# Patient Record
Sex: MALE | Race: WHITE | Employment: FULL TIME | URBAN - NONMETROPOLITAN AREA
[De-identification: names, ages, dates, MRNs, and addresses within clinical notes are randomized per-mention and may not be internally consistent; named-entity substitution may affect disease eponyms.]

---

## 2024-07-26 ENCOUNTER — APPOINTMENT (OUTPATIENT)
Dept: CT IMAGING | Age: 44
DRG: 390 | End: 2024-07-26
Payer: OTHER GOVERNMENT

## 2024-07-26 ENCOUNTER — HOSPITAL ENCOUNTER (INPATIENT)
Age: 44
LOS: 3 days | Discharge: ANOTHER HOSPITAL | DRG: 390 | End: 2024-07-29
Attending: INTERNAL MEDICINE | Admitting: INTERNAL MEDICINE
Payer: OTHER GOVERNMENT

## 2024-07-26 DIAGNOSIS — K56.609 SBO (SMALL BOWEL OBSTRUCTION) (HCC): Primary | ICD-10-CM

## 2024-07-26 LAB
ALBUMIN SERPL-MCNC: 5.1 G/DL (ref 3.5–5.2)
ALBUMIN/GLOB SERPL: 1.4 {RATIO} (ref 1–2.5)
ALP SERPL-CCNC: 143 U/L (ref 40–129)
ALT SERPL-CCNC: 30 U/L (ref 5–41)
ANION GAP SERPL CALCULATED.3IONS-SCNC: 12 MMOL/L (ref 9–17)
AST SERPL-CCNC: 27 U/L
BASOPHILS # BLD: 0.07 K/UL (ref 0–0.2)
BASOPHILS NFR BLD: 0 % (ref 0–2)
BILIRUB SERPL-MCNC: 1.6 MG/DL (ref 0.3–1.2)
BILIRUB UR QL STRIP: NEGATIVE
BUN SERPL-MCNC: 14 MG/DL (ref 6–20)
BUN/CREAT SERPL: 14 (ref 9–20)
CALCIUM SERPL-MCNC: 9.7 MG/DL (ref 8.6–10.4)
CHLORIDE SERPL-SCNC: 96 MMOL/L (ref 98–107)
CLARITY UR: CLEAR
CO2 SERPL-SCNC: 30 MMOL/L (ref 20–31)
COLOR UR: YELLOW
CREAT SERPL-MCNC: 1 MG/DL (ref 0.7–1.2)
EOSINOPHIL # BLD: 0.36 K/UL (ref 0–0.44)
EOSINOPHILS RELATIVE PERCENT: 2 % (ref 1–4)
EPI CELLS #/AREA URNS HPF: NORMAL /HPF (ref 0–5)
ERYTHROCYTE [DISTWIDTH] IN BLOOD BY AUTOMATED COUNT: 14.4 % (ref 11.8–14.4)
GFR, ESTIMATED: >90 ML/MIN/1.73M2
GLUCOSE SERPL-MCNC: 104 MG/DL (ref 70–99)
GLUCOSE UR STRIP-MCNC: NEGATIVE MG/DL
HCT VFR BLD AUTO: 50.5 % (ref 40.7–50.3)
HGB BLD-MCNC: 17.2 G/DL (ref 13–17)
HGB UR QL STRIP.AUTO: NEGATIVE
IMM GRANULOCYTES # BLD AUTO: 0.11 K/UL (ref 0–0.3)
IMM GRANULOCYTES NFR BLD: 1 %
KETONES UR STRIP-MCNC: NEGATIVE MG/DL
LACTATE BLDV-SCNC: 1.4 MMOL/L (ref 0.5–2.2)
LEUKOCYTE ESTERASE UR QL STRIP: NEGATIVE
LIPASE SERPL-CCNC: 20 U/L (ref 13–60)
LYMPHOCYTES NFR BLD: 1.64 K/UL (ref 1.1–3.7)
LYMPHOCYTES RELATIVE PERCENT: 10 % (ref 24–43)
MCH RBC QN AUTO: 29.1 PG (ref 25.2–33.5)
MCHC RBC AUTO-ENTMCNC: 34.1 G/DL (ref 28.4–34.8)
MCV RBC AUTO: 85.3 FL (ref 82.6–102.9)
MONOCYTES NFR BLD: 1.03 K/UL (ref 0.1–1.2)
MONOCYTES NFR BLD: 6 % (ref 3–12)
NEUTROPHILS NFR BLD: 80 % (ref 36–65)
NEUTS SEG NFR BLD: 12.82 K/UL (ref 1.5–8.1)
NITRITE UR QL STRIP: NEGATIVE
NRBC BLD-RTO: 0 PER 100 WBC
PH UR STRIP: 6 [PH] (ref 5–9)
PLATELET # BLD AUTO: 291 K/UL (ref 138–453)
PMV BLD AUTO: 8.4 FL (ref 8.1–13.5)
POTASSIUM SERPL-SCNC: 3.8 MMOL/L (ref 3.7–5.3)
PROT SERPL-MCNC: 8.7 G/DL (ref 6.4–8.3)
PROT UR STRIP-MCNC: NEGATIVE MG/DL
RBC # BLD AUTO: 5.92 M/UL (ref 4.21–5.77)
RBC #/AREA URNS HPF: NORMAL /HPF (ref 0–2)
SODIUM SERPL-SCNC: 138 MMOL/L (ref 135–144)
SP GR UR STRIP: 1.01 (ref 1.01–1.02)
UROBILINOGEN UR STRIP-ACNC: NORMAL EU/DL (ref 0–1)
WBC #/AREA URNS HPF: NORMAL /HPF (ref 0–5)
WBC OTHER # BLD: 16 K/UL (ref 3.5–11.3)

## 2024-07-26 PROCEDURE — 99285 EMERGENCY DEPT VISIT HI MDM: CPT

## 2024-07-26 PROCEDURE — 85025 COMPLETE CBC W/AUTO DIFF WBC: CPT

## 2024-07-26 PROCEDURE — 74177 CT ABD & PELVIS W/CONTRAST: CPT

## 2024-07-26 PROCEDURE — 6360000004 HC RX CONTRAST MEDICATION: Performed by: PHYSICIAN ASSISTANT

## 2024-07-26 PROCEDURE — 2580000003 HC RX 258: Performed by: INTERNAL MEDICINE

## 2024-07-26 PROCEDURE — 83690 ASSAY OF LIPASE: CPT

## 2024-07-26 PROCEDURE — 1200000000 HC SEMI PRIVATE

## 2024-07-26 PROCEDURE — 96374 THER/PROPH/DIAG INJ IV PUSH: CPT

## 2024-07-26 PROCEDURE — 81001 URINALYSIS AUTO W/SCOPE: CPT

## 2024-07-26 PROCEDURE — 83605 ASSAY OF LACTIC ACID: CPT

## 2024-07-26 PROCEDURE — 6360000002 HC RX W HCPCS: Performed by: EMERGENCY MEDICINE

## 2024-07-26 PROCEDURE — 2580000003 HC RX 258: Performed by: PHYSICIAN ASSISTANT

## 2024-07-26 PROCEDURE — 80053 COMPREHEN METABOLIC PANEL: CPT

## 2024-07-26 RX ORDER — POLYETHYLENE GLYCOL 3350 17 G/17G
17 POWDER, FOR SOLUTION ORAL DAILY PRN
Status: DISCONTINUED | OUTPATIENT
Start: 2024-07-26 | End: 2024-07-30 | Stop reason: HOSPADM

## 2024-07-26 RX ORDER — SODIUM CHLORIDE 0.9 % (FLUSH) 0.9 %
5-40 SYRINGE (ML) INJECTION EVERY 12 HOURS SCHEDULED
Status: DISCONTINUED | OUTPATIENT
Start: 2024-07-26 | End: 2024-07-30 | Stop reason: HOSPADM

## 2024-07-26 RX ORDER — MAGNESIUM SULFATE IN WATER 40 MG/ML
2000 INJECTION, SOLUTION INTRAVENOUS PRN
Status: DISCONTINUED | OUTPATIENT
Start: 2024-07-26 | End: 2024-07-30 | Stop reason: HOSPADM

## 2024-07-26 RX ORDER — ACETAMINOPHEN 325 MG/1
650 TABLET ORAL EVERY 6 HOURS PRN
Status: DISCONTINUED | OUTPATIENT
Start: 2024-07-26 | End: 2024-07-30 | Stop reason: HOSPADM

## 2024-07-26 RX ORDER — SODIUM CHLORIDE 9 MG/ML
INJECTION, SOLUTION INTRAVENOUS CONTINUOUS
Status: DISCONTINUED | OUTPATIENT
Start: 2024-07-26 | End: 2024-07-30 | Stop reason: HOSPADM

## 2024-07-26 RX ORDER — SODIUM CHLORIDE 0.9 % (FLUSH) 0.9 %
10 SYRINGE (ML) INJECTION PRN
Status: DISCONTINUED | OUTPATIENT
Start: 2024-07-26 | End: 2024-07-30 | Stop reason: HOSPADM

## 2024-07-26 RX ORDER — ONDANSETRON 2 MG/ML
4 INJECTION INTRAMUSCULAR; INTRAVENOUS ONCE
Status: COMPLETED | OUTPATIENT
Start: 2024-07-26 | End: 2024-07-26

## 2024-07-26 RX ORDER — POTASSIUM CHLORIDE 20 MEQ/1
40 TABLET, EXTENDED RELEASE ORAL PRN
Status: DISCONTINUED | OUTPATIENT
Start: 2024-07-26 | End: 2024-07-30 | Stop reason: HOSPADM

## 2024-07-26 RX ORDER — ONDANSETRON 2 MG/ML
4 INJECTION INTRAMUSCULAR; INTRAVENOUS EVERY 6 HOURS PRN
Status: DISCONTINUED | OUTPATIENT
Start: 2024-07-26 | End: 2024-07-30 | Stop reason: HOSPADM

## 2024-07-26 RX ORDER — 0.9 % SODIUM CHLORIDE 0.9 %
1000 INTRAVENOUS SOLUTION INTRAVENOUS ONCE
Status: COMPLETED | OUTPATIENT
Start: 2024-07-26 | End: 2024-07-26

## 2024-07-26 RX ORDER — SODIUM CHLORIDE 9 MG/ML
INJECTION, SOLUTION INTRAVENOUS PRN
Status: DISCONTINUED | OUTPATIENT
Start: 2024-07-26 | End: 2024-07-30 | Stop reason: HOSPADM

## 2024-07-26 RX ORDER — ONDANSETRON 4 MG/1
4 TABLET, ORALLY DISINTEGRATING ORAL EVERY 8 HOURS PRN
Status: DISCONTINUED | OUTPATIENT
Start: 2024-07-26 | End: 2024-07-30 | Stop reason: HOSPADM

## 2024-07-26 RX ORDER — ACETAMINOPHEN 650 MG/1
650 SUPPOSITORY RECTAL EVERY 6 HOURS PRN
Status: DISCONTINUED | OUTPATIENT
Start: 2024-07-26 | End: 2024-07-30 | Stop reason: HOSPADM

## 2024-07-26 RX ORDER — POTASSIUM CHLORIDE 7.45 MG/ML
10 INJECTION INTRAVENOUS PRN
Status: DISCONTINUED | OUTPATIENT
Start: 2024-07-26 | End: 2024-07-30 | Stop reason: HOSPADM

## 2024-07-26 RX ADMIN — SODIUM CHLORIDE 1000 ML: 9 INJECTION, SOLUTION INTRAVENOUS at 18:48

## 2024-07-26 RX ADMIN — SODIUM CHLORIDE: 9 INJECTION, SOLUTION INTRAVENOUS at 21:16

## 2024-07-26 RX ADMIN — IOPAMIDOL 75 ML: 755 INJECTION, SOLUTION INTRAVENOUS at 19:16

## 2024-07-26 RX ADMIN — ONDANSETRON 4 MG: 2 INJECTION INTRAMUSCULAR; INTRAVENOUS at 18:39

## 2024-07-26 ASSESSMENT — PAIN - FUNCTIONAL ASSESSMENT: PAIN_FUNCTIONAL_ASSESSMENT: ACTIVITIES ARE NOT PREVENTED

## 2024-07-26 ASSESSMENT — PAIN DESCRIPTION - FREQUENCY: FREQUENCY: INTERMITTENT

## 2024-07-26 ASSESSMENT — PAIN DESCRIPTION - ORIENTATION: ORIENTATION: MID

## 2024-07-26 ASSESSMENT — PAIN DESCRIPTION - ONSET: ONSET: PROGRESSIVE

## 2024-07-26 ASSESSMENT — LIFESTYLE VARIABLES
HOW OFTEN DO YOU HAVE A DRINK CONTAINING ALCOHOL: NEVER
HOW MANY STANDARD DRINKS CONTAINING ALCOHOL DO YOU HAVE ON A TYPICAL DAY: PATIENT DOES NOT DRINK
HOW MANY STANDARD DRINKS CONTAINING ALCOHOL DO YOU HAVE ON A TYPICAL DAY: PATIENT DOES NOT DRINK
HOW OFTEN DO YOU HAVE A DRINK CONTAINING ALCOHOL: NEVER

## 2024-07-26 ASSESSMENT — PAIN DESCRIPTION - DESCRIPTORS: DESCRIPTORS: STABBING

## 2024-07-26 ASSESSMENT — PAIN SCALES - GENERAL: PAINLEVEL_OUTOF10: 2

## 2024-07-26 ASSESSMENT — PAIN DESCRIPTION - LOCATION: LOCATION: ABDOMEN

## 2024-07-26 ASSESSMENT — PAIN DESCRIPTION - PAIN TYPE: TYPE: ACUTE PAIN

## 2024-07-26 NOTE — ED PROVIDER NOTES
[facility]  EMERGENCY DEPARTMENT ENCOUNTER      Pt Name: Fox Morales  MRN: [id number]  Birthdate 1980  Date of evaluation: 7/26/2024  Provider: Sindy Royal PA-C    CHIEF COMPLAINT       Chief Complaint   Patient presents with    Abdominal Pain     Mid abd pain with emesis and constipation since Tuesday, states history of \"collapsed bowel\"          HISTORY OF PRESENT ILLNESS      Fox Morales is a 43 y.o. male who presents to the emergency department due to nausea, vomiting, diarrhea.  Patient states that he ate fast food 5 days ago.  Shortly after taking a few bites he began to feel nauseous.  He had multiple episodes of diarrhea.  He then developed nausea and vomiting.  He has continued to have nausea and vomiting since then.  He is no longer having diarrhea but now feels constipated.  He is unable to eat or drink as he is vomiting everything.  He does not have any focal abdominal pain but does have diffuse abdominal discomfort.  No fever.  He does believe he had chills at the beginning of the illness but none since then.  There are no other complaints or concerns.        REVIEW OF SYSTEMS       Review of Systems   Constitutional:  Positive for chills. Negative for fever.   Respiratory:  Negative for cough and shortness of breath.    Cardiovascular:  Negative for chest pain.   Gastrointestinal:  Positive for abdominal pain, constipation, diarrhea, nausea and vomiting.         PAST MEDICAL HISTORY     Past Medical History:   Diagnosis Date    Atrial flutter (HCC)     Gout     Hypertension          SURGICAL HISTORY       Past Surgical History:   Procedure Laterality Date    ABLATION OF DYSRHYTHMIC FOCUS      \"d/t a-flutter\"    TONSILLECTOMY           CURRENT MEDICATIONS       Previous Medications    ADALIMUMAB (HUMIRA PEN) 40 MG/0.4ML PNKT    Inject into the skin    ADALIMUMAB 20 MG/0.4ML PSKT    Inject 40 mg into the skin every 14 days    ALLOPURINOL (ZYLOPRIM) 100 MG TABLET    Take 3

## 2024-07-27 LAB
ALBUMIN SERPL-MCNC: 4 G/DL (ref 3.5–5.2)
ALBUMIN/GLOB SERPL: 1.3 {RATIO} (ref 1–2.5)
ALP SERPL-CCNC: 109 U/L (ref 40–129)
ALT SERPL-CCNC: 24 U/L (ref 5–41)
ANION GAP SERPL CALCULATED.3IONS-SCNC: 9 MMOL/L (ref 9–17)
AST SERPL-CCNC: 23 U/L
BASOPHILS # BLD: 0.1 K/UL (ref 0–0.2)
BASOPHILS NFR BLD: 1 % (ref 0–2)
BILIRUB SERPL-MCNC: 1.4 MG/DL (ref 0.3–1.2)
BUN SERPL-MCNC: 15 MG/DL (ref 6–20)
BUN/CREAT SERPL: 17 (ref 9–20)
CALCIUM SERPL-MCNC: 8.3 MG/DL (ref 8.6–10.4)
CHLORIDE SERPL-SCNC: 101 MMOL/L (ref 98–107)
CO2 SERPL-SCNC: 29 MMOL/L (ref 20–31)
CREAT SERPL-MCNC: 0.9 MG/DL (ref 0.7–1.2)
EOSINOPHIL # BLD: 0.29 K/UL (ref 0–0.44)
EOSINOPHILS RELATIVE PERCENT: 3 % (ref 1–4)
ERYTHROCYTE [DISTWIDTH] IN BLOOD BY AUTOMATED COUNT: 14.6 % (ref 11.8–14.4)
GFR, ESTIMATED: >90 ML/MIN/1.73M2
GLUCOSE SERPL-MCNC: 100 MG/DL (ref 70–99)
HCT VFR BLD AUTO: 41.4 % (ref 40.7–50.3)
HGB BLD-MCNC: 14 G/DL (ref 13–17)
IMM GRANULOCYTES # BLD AUTO: 0.1 K/UL (ref 0–0.3)
IMM GRANULOCYTES NFR BLD: 1 %
LYMPHOCYTES NFR BLD: 1.71 K/UL (ref 1.1–3.7)
LYMPHOCYTES RELATIVE PERCENT: 18 % (ref 24–43)
MCH RBC QN AUTO: 29.1 PG (ref 25.2–33.5)
MCHC RBC AUTO-ENTMCNC: 33.8 G/DL (ref 28.4–34.8)
MCV RBC AUTO: 86.1 FL (ref 82.6–102.9)
MONOCYTES NFR BLD: 0.76 K/UL (ref 0.1–1.2)
MONOCYTES NFR BLD: 8 % (ref 3–12)
MORPHOLOGY: NORMAL
NEUTROPHILS NFR BLD: 69 % (ref 36–65)
NEUTS SEG NFR BLD: 6.54 K/UL (ref 1.5–8.1)
NRBC BLD-RTO: 0 PER 100 WBC
PLATELET # BLD AUTO: 228 K/UL (ref 138–453)
PMV BLD AUTO: 8.7 FL (ref 8.1–13.5)
POTASSIUM SERPL-SCNC: 3.8 MMOL/L (ref 3.7–5.3)
PROT SERPL-MCNC: 7 G/DL (ref 6.4–8.3)
RBC # BLD AUTO: 4.81 M/UL (ref 4.21–5.77)
SODIUM SERPL-SCNC: 139 MMOL/L (ref 135–144)
WBC OTHER # BLD: 9.5 K/UL (ref 3.5–11.3)

## 2024-07-27 PROCEDURE — 1200000000 HC SEMI PRIVATE

## 2024-07-27 PROCEDURE — 80053 COMPREHEN METABOLIC PANEL: CPT

## 2024-07-27 PROCEDURE — 6360000002 HC RX W HCPCS: Performed by: INTERNAL MEDICINE

## 2024-07-27 PROCEDURE — 2580000003 HC RX 258: Performed by: INTERNAL MEDICINE

## 2024-07-27 PROCEDURE — 2580000003 HC RX 258: Performed by: SPECIALIST

## 2024-07-27 PROCEDURE — 36415 COLL VENOUS BLD VENIPUNCTURE: CPT

## 2024-07-27 PROCEDURE — 94761 N-INVAS EAR/PLS OXIMETRY MLT: CPT

## 2024-07-27 PROCEDURE — 85025 COMPLETE CBC W/AUTO DIFF WBC: CPT

## 2024-07-27 RX ORDER — METOCLOPRAMIDE HYDROCHLORIDE 5 MG/ML
5 INJECTION INTRAMUSCULAR; INTRAVENOUS EVERY 6 HOURS PRN
Status: DISCONTINUED | OUTPATIENT
Start: 2024-07-27 | End: 2024-07-28

## 2024-07-27 RX ORDER — SODIUM CHLORIDE, SODIUM LACTATE, POTASSIUM CHLORIDE, AND CALCIUM CHLORIDE .6; .31; .03; .02 G/100ML; G/100ML; G/100ML; G/100ML
1000 INJECTION, SOLUTION INTRAVENOUS ONCE
Status: COMPLETED | OUTPATIENT
Start: 2024-07-27 | End: 2024-07-27

## 2024-07-27 RX ADMIN — METOCLOPRAMIDE 5 MG: 5 INJECTION, SOLUTION INTRAMUSCULAR; INTRAVENOUS at 20:58

## 2024-07-27 RX ADMIN — SODIUM CHLORIDE: 9 INJECTION, SOLUTION INTRAVENOUS at 14:58

## 2024-07-27 RX ADMIN — SODIUM CHLORIDE: 9 INJECTION, SOLUTION INTRAVENOUS at 06:38

## 2024-07-27 RX ADMIN — SODIUM CHLORIDE: 9 INJECTION, SOLUTION INTRAVENOUS at 23:44

## 2024-07-27 RX ADMIN — SODIUM CHLORIDE, SODIUM LACTATE, POTASSIUM CHLORIDE, AND CALCIUM CHLORIDE 1000 ML: .6; .31; .03; .02 INJECTION, SOLUTION INTRAVENOUS at 13:51

## 2024-07-27 ASSESSMENT — PAIN DESCRIPTION - DESCRIPTORS
DESCRIPTORS: SHARP
DESCRIPTORS: CRAMPING
DESCRIPTORS: STABBING;CRAMPING

## 2024-07-27 ASSESSMENT — PAIN DESCRIPTION - PAIN TYPE
TYPE: ACUTE PAIN

## 2024-07-27 ASSESSMENT — PAIN - FUNCTIONAL ASSESSMENT
PAIN_FUNCTIONAL_ASSESSMENT: PREVENTS OR INTERFERES SOME ACTIVE ACTIVITIES AND ADLS
PAIN_FUNCTIONAL_ASSESSMENT: ACTIVITIES ARE NOT PREVENTED
PAIN_FUNCTIONAL_ASSESSMENT: ACTIVITIES ARE NOT PREVENTED

## 2024-07-27 ASSESSMENT — PAIN SCALES - GENERAL
PAINLEVEL_OUTOF10: 1

## 2024-07-27 ASSESSMENT — PAIN DESCRIPTION - FREQUENCY
FREQUENCY: INTERMITTENT

## 2024-07-27 ASSESSMENT — PAIN DESCRIPTION - ONSET
ONSET: PROGRESSIVE
ONSET: PROGRESSIVE

## 2024-07-27 ASSESSMENT — PAIN DESCRIPTION - LOCATION
LOCATION: ABDOMEN

## 2024-07-27 NOTE — CONSULTS
Chief complaint crampy abdominal pain with abdominal distention    History of present illness patient is a 43-year-old male with a prior history of atrial flutter underwent ablation several years ago and has since been maintained on a low-dose of metoprolol without any evidence of atrial flutter presented to the emergency room stating that in the last several days he has developed increasing crampy abdominal pain initially with diarrheal type bowel movements which stopped several days ago and is felt constipated since then he denies fevers or chills no frequency urgency or dysuria he passed a small amount of gas and stool yesterday and again today patient was admitted because a CT scan showed evidence of large and small bowel gaseous distention consistent with a paralytic ileus interpreted by the radiologist as a early small bowel obstruction    Past medical history  1.  History of atrial flutter  2.  Gout  3.  Hypertension    Past surgical history  1.  Ablation of a dysthymic focus  2.  Tonsillectomy    Medications  1.  Allopurinol 100 mg tablet 3 tablets by mouth daily  2.  Cymbalta 30 mg p.o. once a day  3.  Metoprolol 25 mg p.o. once by mouth daily  4.  Testosterone IM injection once a week    Allergies to shellfish    Family history noncontributory    Review of systems patient denies any vertigo diplopia no circumoral numbness dysarthria dysphagia no hot or cold intolerance no history of jaundice yahaira colored stools or dark-colored urine appetite is generally good weight is stable no hematemesis hematochezia melena no blood dyscrasias    Physical examination  Patient is awake and alert resting comfortably in bed  Vital signs are stable he is afebrile  HEENT normocephalic atraumatic extract muscles intact pupils equal round reactive to light and accommodate TMs clear oropharynx clear neck supple without adenopathy  Chest clear to auscultation percussion  Cardiovascular regular rate and rhythm without murmur

## 2024-07-27 NOTE — PLAN OF CARE
Problem: Discharge Planning  Goal: Discharge to home or other facility with appropriate resources  7/27/2024 0710 by Elisha Benz RN  Outcome: Progressing     Problem: Pain  Goal: Verbalizes/displays adequate comfort level or baseline comfort level  7/27/2024 0710 by Elisha Benz RN  Outcome: Progressing     Problem: Gastrointestinal - Adult  Goal: Minimal or absence of nausea and vomiting  7/27/2024 0710 by Elisha Benz RN  Outcome: Progressing     Problem: Gastrointestinal - Adult  Goal: Maintains or returns to baseline bowel function  7/27/2024 0710 by Elisha Benz RN  Outcome: Progressing     Problem: Gastrointestinal - Adult  Goal: Maintains adequate nutritional intake  7/27/2024 0710 by Elisha Benz RN  Outcome: Progressing     Problem: Infection - Adult  Goal: Absence of infection at discharge  7/27/2024 0710 by Elisha Benz RN  Outcome: Progressing     Problem: Infection - Adult  Goal: Absence of infection during hospitalization  7/27/2024 0710 by Elisha Benz RN  Outcome: Progressing

## 2024-07-27 NOTE — H&P
Shreyas Sanchez M.D.  Internal Medicine History and Phyisical    Patient: Fox Morales  Date of Admission: 7/26/2024  6:21 PM  Date of Evaluation: 7/27/2024      Patient:  Fox Morales  MRN: [id number]    Chief Complaint:    Chief Complaint   Patient presents with    Abdominal Pain     Mid abd pain with emesis and constipation since Tuesday, states history of \"collapsed bowel\"        History Obtained From:  patient, electronic medical record    PCP: Bernard Coker MD    History of Present Illness:   The patient is a 43 y.o. male who presents with vomiting and some constipation with abdominal pain for about 3 to 4 days.  Has a history of a bowel obstruction remotely when he had appendectomy done several years ago.  This morning he is passing gas and he had a small bowel movement yesterday.    Past Medical History:        Diagnosis Date    Atrial flutter (HCC)     Gout     Hypertension        Past Surgical History:        Procedure Laterality Date    ABLATION OF DYSRHYTHMIC FOCUS      \"d/t a-flutter\"    TONSILLECTOMY         Family History:   History reviewed. No pertinent family history.    Social History:   TOBACCO:   reports that he has been smoking cigarettes. He has never used smokeless tobacco.  ETOH:   reports current alcohol use.      Allergies:  Shellfish-derived products    Medications Prior to Admission:    Prior to Admission medications    Medication Sig Start Date End Date Taking? Authorizing Provider   TESTOSTERONE IM Inject 1 mL into the muscle once a week Weekly on Tuesdays   Yes Ana Paula Celaya MD   DULoxetine (CYMBALTA) 30 MG extended release capsule Take 1 capsule by mouth daily    ProviderAna Paula MD   metoprolol tartrate (LOPRESSOR) 25 MG tablet Take 1 tablet by mouth daily    Ana Paula Celaya MD   ketorolac (TORADOL) 10 MG tablet Take 1 tablet by mouth every 6 hours as needed for Pain  Patient not taking: Reported on 7/26/2024 2/20/22 2/20/23  Amber Raza PA   [email or web address]

## 2024-07-27 NOTE — PLAN OF CARE
Problem: Discharge Planning  Goal: Discharge to home or other facility with appropriate resources  Outcome: Progressing     Problem: Pain  Goal: Verbalizes/displays adequate comfort level or baseline comfort level  Outcome: Progressing     Problem: Gastrointestinal - Adult  Goal: Minimal or absence of nausea and vomiting  Outcome: Progressing  Goal: Maintains or returns to baseline bowel function  Outcome: Progressing  Goal: Maintains adequate nutritional intake  Outcome: Progressing     Problem: Infection - Adult  Goal: Absence of infection at discharge  Outcome: Progressing  Goal: Absence of infection during hospitalization  Outcome: Progressing

## 2024-07-28 ENCOUNTER — APPOINTMENT (OUTPATIENT)
Dept: GENERAL RADIOLOGY | Age: 44
DRG: 390 | End: 2024-07-28
Payer: OTHER GOVERNMENT

## 2024-07-28 LAB
ALBUMIN SERPL-MCNC: 4 G/DL (ref 3.5–5.2)
ALBUMIN/GLOB SERPL: 1.4 {RATIO} (ref 1–2.5)
ALP SERPL-CCNC: 106 U/L (ref 40–129)
ALT SERPL-CCNC: 23 U/L (ref 5–41)
ANION GAP SERPL CALCULATED.3IONS-SCNC: 10 MMOL/L (ref 9–17)
AST SERPL-CCNC: 24 U/L
BASOPHILS # BLD: <0.03 K/UL (ref 0–0.2)
BASOPHILS NFR BLD: 0 % (ref 0–2)
BILIRUB SERPL-MCNC: 1.5 MG/DL (ref 0.3–1.2)
BUN SERPL-MCNC: 17 MG/DL (ref 6–20)
BUN/CREAT SERPL: 21 (ref 9–20)
CALCIUM SERPL-MCNC: 8.5 MG/DL (ref 8.6–10.4)
CHLORIDE SERPL-SCNC: 98 MMOL/L (ref 98–107)
CO2 SERPL-SCNC: 26 MMOL/L (ref 20–31)
CREAT SERPL-MCNC: 0.8 MG/DL (ref 0.7–1.2)
EOSINOPHIL # BLD: 0.24 K/UL (ref 0–0.44)
EOSINOPHILS RELATIVE PERCENT: 3 % (ref 1–4)
ERYTHROCYTE [DISTWIDTH] IN BLOOD BY AUTOMATED COUNT: 14.2 % (ref 11.8–14.4)
GFR, ESTIMATED: >90 ML/MIN/1.73M2
GLUCOSE SERPL-MCNC: 84 MG/DL (ref 70–99)
HCT VFR BLD AUTO: 39.5 % (ref 40.7–50.3)
HGB BLD-MCNC: 13.8 G/DL (ref 13–17)
IMM GRANULOCYTES # BLD AUTO: 0.05 K/UL (ref 0–0.3)
IMM GRANULOCYTES NFR BLD: 1 %
LYMPHOCYTES NFR BLD: 1.31 K/UL (ref 1.1–3.7)
LYMPHOCYTES RELATIVE PERCENT: 15 % (ref 24–43)
MCH RBC QN AUTO: 29.7 PG (ref 25.2–33.5)
MCHC RBC AUTO-ENTMCNC: 34.9 G/DL (ref 28.4–34.8)
MCV RBC AUTO: 84.9 FL (ref 82.6–102.9)
MONOCYTES NFR BLD: 0.62 K/UL (ref 0.1–1.2)
MONOCYTES NFR BLD: 7 % (ref 3–12)
NEUTROPHILS NFR BLD: 74 % (ref 36–65)
NEUTS SEG NFR BLD: 6.25 K/UL (ref 1.5–8.1)
NRBC BLD-RTO: 0 PER 100 WBC
PLATELET # BLD AUTO: 207 K/UL (ref 138–453)
PMV BLD AUTO: 8.7 FL (ref 8.1–13.5)
POTASSIUM SERPL-SCNC: 3.8 MMOL/L (ref 3.7–5.3)
PROT SERPL-MCNC: 6.8 G/DL (ref 6.4–8.3)
RBC # BLD AUTO: 4.65 M/UL (ref 4.21–5.77)
SODIUM SERPL-SCNC: 134 MMOL/L (ref 135–144)
WBC OTHER # BLD: 8.5 K/UL (ref 3.5–11.3)

## 2024-07-28 PROCEDURE — 6370000000 HC RX 637 (ALT 250 FOR IP): Performed by: INTERNAL MEDICINE

## 2024-07-28 PROCEDURE — 85025 COMPLETE CBC W/AUTO DIFF WBC: CPT

## 2024-07-28 PROCEDURE — 6360000002 HC RX W HCPCS: Performed by: INTERNAL MEDICINE

## 2024-07-28 PROCEDURE — 1200000000 HC SEMI PRIVATE

## 2024-07-28 PROCEDURE — 94761 N-INVAS EAR/PLS OXIMETRY MLT: CPT

## 2024-07-28 PROCEDURE — 80053 COMPREHEN METABOLIC PANEL: CPT

## 2024-07-28 PROCEDURE — 2580000003 HC RX 258: Performed by: INTERNAL MEDICINE

## 2024-07-28 PROCEDURE — 36415 COLL VENOUS BLD VENIPUNCTURE: CPT

## 2024-07-28 PROCEDURE — 74019 RADEX ABDOMEN 2 VIEWS: CPT

## 2024-07-28 RX ORDER — DULOXETIN HYDROCHLORIDE 30 MG/1
30 CAPSULE, DELAYED RELEASE ORAL DAILY
Status: DISCONTINUED | OUTPATIENT
Start: 2024-07-28 | End: 2024-07-28

## 2024-07-28 RX ORDER — METOCLOPRAMIDE HYDROCHLORIDE 5 MG/ML
5 INJECTION INTRAMUSCULAR; INTRAVENOUS EVERY 6 HOURS
Status: DISCONTINUED | OUTPATIENT
Start: 2024-07-28 | End: 2024-07-30 | Stop reason: HOSPADM

## 2024-07-28 RX ORDER — ALLOPURINOL 300 MG/1
300 TABLET ORAL DAILY
Status: DISCONTINUED | OUTPATIENT
Start: 2024-07-28 | End: 2024-07-30 | Stop reason: HOSPADM

## 2024-07-28 RX ORDER — SERTRALINE HYDROCHLORIDE 100 MG/1
100 TABLET, FILM COATED ORAL DAILY
Status: DISCONTINUED | OUTPATIENT
Start: 2024-07-28 | End: 2024-07-30 | Stop reason: HOSPADM

## 2024-07-28 RX ORDER — SERTRALINE HYDROCHLORIDE 100 MG/1
100 TABLET, FILM COATED ORAL DAILY
COMMUNITY

## 2024-07-28 RX ADMIN — ALLOPURINOL 300 MG: 300 TABLET ORAL at 10:37

## 2024-07-28 RX ADMIN — METOPROLOL TARTRATE 25 MG: 25 TABLET, FILM COATED ORAL at 07:44

## 2024-07-28 RX ADMIN — SODIUM CHLORIDE: 9 INJECTION, SOLUTION INTRAVENOUS at 23:54

## 2024-07-28 RX ADMIN — METOCLOPRAMIDE 5 MG: 5 INJECTION, SOLUTION INTRAMUSCULAR; INTRAVENOUS at 22:36

## 2024-07-28 RX ADMIN — SERTRALINE 100 MG: 100 TABLET, FILM COATED ORAL at 09:30

## 2024-07-28 RX ADMIN — SODIUM CHLORIDE: 9 INJECTION, SOLUTION INTRAVENOUS at 08:23

## 2024-07-28 RX ADMIN — METOCLOPRAMIDE 5 MG: 5 INJECTION, SOLUTION INTRAMUSCULAR; INTRAVENOUS at 10:37

## 2024-07-28 RX ADMIN — ONDANSETRON 4 MG: 2 INJECTION INTRAMUSCULAR; INTRAVENOUS at 13:20

## 2024-07-28 RX ADMIN — ONDANSETRON 4 MG: 2 INJECTION INTRAMUSCULAR; INTRAVENOUS at 20:54

## 2024-07-28 RX ADMIN — METOCLOPRAMIDE 5 MG: 5 INJECTION, SOLUTION INTRAMUSCULAR; INTRAVENOUS at 16:37

## 2024-07-28 RX ADMIN — METOCLOPRAMIDE 5 MG: 5 INJECTION, SOLUTION INTRAMUSCULAR; INTRAVENOUS at 04:20

## 2024-07-28 ASSESSMENT — PAIN SCALES - GENERAL
PAINLEVEL_OUTOF10: 2
PAINLEVEL_OUTOF10: 2
PAINLEVEL_OUTOF10: 0

## 2024-07-28 ASSESSMENT — PAIN - FUNCTIONAL ASSESSMENT
PAIN_FUNCTIONAL_ASSESSMENT: ACTIVITIES ARE NOT PREVENTED
PAIN_FUNCTIONAL_ASSESSMENT: ACTIVITIES ARE NOT PREVENTED

## 2024-07-28 ASSESSMENT — PAIN DESCRIPTION - PAIN TYPE
TYPE: ACUTE PAIN
TYPE: ACUTE PAIN

## 2024-07-28 ASSESSMENT — PAIN DESCRIPTION - ONSET
ONSET: ON-GOING
ONSET: PROGRESSIVE

## 2024-07-28 ASSESSMENT — PAIN DESCRIPTION - ORIENTATION
ORIENTATION: MID
ORIENTATION: MID

## 2024-07-28 ASSESSMENT — PAIN DESCRIPTION - LOCATION
LOCATION: ABDOMEN
LOCATION: ABDOMEN

## 2024-07-28 ASSESSMENT — PAIN DESCRIPTION - DESCRIPTORS
DESCRIPTORS: DISCOMFORT
DESCRIPTORS: DISCOMFORT

## 2024-07-28 ASSESSMENT — PAIN DESCRIPTION - FREQUENCY
FREQUENCY: INTERMITTENT
FREQUENCY: INTERMITTENT

## 2024-07-28 NOTE — PLAN OF CARE
Problem: Discharge Planning  Goal: Discharge to home or other facility with appropriate resources  Outcome: Progressing  Flowsheets (Taken 7/27/2024 1845)  Discharge to home or other facility with appropriate resources:   Identify barriers to discharge with patient and caregiver   Arrange for needed discharge resources and transportation as appropriate   Identify discharge learning needs (meds, wound care, etc)   Refer to discharge planning if patient needs post-hospital services based on physician order or complex needs related to functional status, cognitive ability or social support system     Problem: Pain  Goal: Verbalizes/displays adequate comfort level or baseline comfort level  Outcome: Progressing  Flowsheets (Taken 7/28/2024 0256)  Verbalizes/displays adequate comfort level or baseline comfort level:   Encourage patient to monitor pain and request assistance   Assess pain using appropriate pain scale   Administer analgesics based on type and severity of pain and evaluate response   Implement non-pharmacological measures as appropriate and evaluate response   Notify Licensed Independent Practitioner if interventions unsuccessful or patient reports new pain     Problem: Gastrointestinal - Adult  Goal: Minimal or absence of nausea and vomiting  Outcome: Progressing  Flowsheets (Taken 7/28/2024 0256)  Minimal or absence of nausea and vomiting:   Administer IV fluids as ordered to ensure adequate hydration   Nasogastric tube to low intermittent suction as ordered   Administer ordered antiemetic medications as needed   Maintain NPO status until nausea and vomiting are resolved   Provide nonpharmacologic comfort measures as appropriate  Goal: Maintains or returns to baseline bowel function  Outcome: Progressing  Flowsheets (Taken 7/28/2024 0256)  Maintains or returns to baseline bowel function:   Assess bowel function   Encourage oral fluids to ensure adequate hydration   Administer IV fluids as ordered to

## 2024-07-28 NOTE — PLAN OF CARE
Problem: Discharge Planning  Goal: Discharge to home or other facility with appropriate resources  7/28/2024 0258 by Lisbeth Gandhi, RN  Outcome: Progressing  Flowsheets (Taken 7/27/2024 1845)  Discharge to home or other facility with appropriate resources:   Identify barriers to discharge with patient and caregiver   Arrange for needed discharge resources and transportation as appropriate   Identify discharge learning needs (meds, wound care, etc)   Refer to discharge planning if patient needs post-hospital services based on physician order or complex needs related to functional status, cognitive ability or social support system     Problem: Pain  Goal: Verbalizes/displays adequate comfort level or baseline comfort level  7/28/2024 0258 by Lisbeth Gandhi, RN  Outcome: Progressing  Flowsheets (Taken 7/28/2024 0256)  Verbalizes/displays adequate comfort level or baseline comfort level:   Encourage patient to monitor pain and request assistance   Assess pain using appropriate pain scale   Administer analgesics based on type and severity of pain and evaluate response   Implement non-pharmacological measures as appropriate and evaluate response   Notify Licensed Independent Practitioner if interventions unsuccessful or patient reports new pain     Problem: Gastrointestinal - Adult  Goal: Minimal or absence of nausea and vomiting  7/28/2024 0258 by Lisbeth Gandhi, RN  Outcome: Progressing  Flowsheets (Taken 7/28/2024 0258)  Minimal or absence of nausea and vomiting: Provide nonpharmacologic comfort measures as appropriate     Problem: Gastrointestinal - Adult  Goal: Maintains or returns to baseline bowel function  7/28/2024 0258 by Lisbeth Gandhi, RN  Outcome: Progressing  Flowsheets (Taken 7/28/2024 0256)  Maintains or returns to baseline bowel function:   Assess bowel function   Encourage oral fluids to ensure adequate hydration   Administer IV fluids as ordered to ensure adequate hydration   Administer

## 2024-07-28 NOTE — PLAN OF CARE
Problem: Discharge Planning  Goal: Discharge to home or other facility with appropriate resources  7/28/2024 0719 by Elisha Benz RN  Outcome: Progressing     Problem: Pain  Goal: Verbalizes/displays adequate comfort level or baseline comfort level  7/28/2024 0719 by Elisha Benz RN  Outcome: Progressing  Flowsheets (Taken 7/28/2024 0700)  Verbalizes/displays adequate comfort level or baseline comfort level:   Encourage patient to monitor pain and request assistance   Assess pain using appropriate pain scale   Implement non-pharmacological measures as appropriate and evaluate response     Problem: Gastrointestinal - Adult  Goal: Minimal or absence of nausea and vomiting  7/28/2024 0719 by Elisha Benz RN  Outcome: Progressing     Problem: Gastrointestinal - Adult  Goal: Maintains or returns to baseline bowel function  7/28/2024 0719 by Elisha Benz RN  Outcome: Progressing     Problem: Gastrointestinal - Adult  Goal: Maintains adequate nutritional intake  7/28/2024 0719 by Elisha Benz RN  Outcome: Progressing

## 2024-07-29 ENCOUNTER — APPOINTMENT (OUTPATIENT)
Dept: GENERAL RADIOLOGY | Age: 44
DRG: 390 | End: 2024-07-29
Payer: OTHER GOVERNMENT

## 2024-07-29 VITALS
SYSTOLIC BLOOD PRESSURE: 141 MMHG | BODY MASS INDEX: 36.93 KG/M2 | RESPIRATION RATE: 18 BRPM | OXYGEN SATURATION: 95 % | DIASTOLIC BLOOD PRESSURE: 81 MMHG | TEMPERATURE: 97.4 F | WEIGHT: 257.94 LBS | HEIGHT: 70 IN | HEART RATE: 61 BPM

## 2024-07-29 LAB
ALBUMIN SERPL-MCNC: 4.1 G/DL (ref 3.5–5.2)
ALBUMIN/GLOB SERPL: 1.3 {RATIO} (ref 1–2.5)
ALP SERPL-CCNC: 115 U/L (ref 40–129)
ALT SERPL-CCNC: 25 U/L (ref 5–41)
ANION GAP SERPL CALCULATED.3IONS-SCNC: 14 MMOL/L (ref 9–17)
AST SERPL-CCNC: 27 U/L
BASOPHILS # BLD: 0.03 K/UL (ref 0–0.2)
BASOPHILS NFR BLD: 0 % (ref 0–2)
BILIRUB SERPL-MCNC: 1.5 MG/DL (ref 0.3–1.2)
BUN SERPL-MCNC: 11 MG/DL (ref 6–20)
BUN/CREAT SERPL: 12 (ref 9–20)
CALCIUM SERPL-MCNC: 8.7 MG/DL (ref 8.6–10.4)
CHLORIDE SERPL-SCNC: 102 MMOL/L (ref 98–107)
CO2 SERPL-SCNC: 23 MMOL/L (ref 20–31)
CREAT SERPL-MCNC: 0.9 MG/DL (ref 0.7–1.2)
EOSINOPHIL # BLD: 0.2 K/UL (ref 0–0.44)
EOSINOPHILS RELATIVE PERCENT: 2 % (ref 1–4)
ERYTHROCYTE [DISTWIDTH] IN BLOOD BY AUTOMATED COUNT: 13.8 % (ref 11.8–14.4)
GFR, ESTIMATED: >90 ML/MIN/1.73M2
GLUCOSE SERPL-MCNC: 85 MG/DL (ref 70–99)
HCT VFR BLD AUTO: 43.1 % (ref 40.7–50.3)
HGB BLD-MCNC: 14.8 G/DL (ref 13–17)
IMM GRANULOCYTES # BLD AUTO: 0.04 K/UL (ref 0–0.3)
IMM GRANULOCYTES NFR BLD: 0 %
LYMPHOCYTES NFR BLD: 1.11 K/UL (ref 1.1–3.7)
LYMPHOCYTES RELATIVE PERCENT: 11 % (ref 24–43)
MCH RBC QN AUTO: 29.2 PG (ref 25.2–33.5)
MCHC RBC AUTO-ENTMCNC: 34.3 G/DL (ref 28.4–34.8)
MCV RBC AUTO: 85 FL (ref 82.6–102.9)
MONOCYTES NFR BLD: 0.65 K/UL (ref 0.1–1.2)
MONOCYTES NFR BLD: 7 % (ref 3–12)
NEUTROPHILS NFR BLD: 80 % (ref 36–65)
NEUTS SEG NFR BLD: 7.72 K/UL (ref 1.5–8.1)
NRBC BLD-RTO: 0 PER 100 WBC
PLATELET # BLD AUTO: 238 K/UL (ref 138–453)
PMV BLD AUTO: 8.5 FL (ref 8.1–13.5)
POTASSIUM SERPL-SCNC: 4.1 MMOL/L (ref 3.7–5.3)
PROT SERPL-MCNC: 7.2 G/DL (ref 6.4–8.3)
RBC # BLD AUTO: 5.07 M/UL (ref 4.21–5.77)
SODIUM SERPL-SCNC: 139 MMOL/L (ref 135–144)
WBC OTHER # BLD: 9.8 K/UL (ref 3.5–11.3)

## 2024-07-29 PROCEDURE — 74250 X-RAY XM SM INT 1CNTRST STD: CPT

## 2024-07-29 PROCEDURE — 6360000002 HC RX W HCPCS: Performed by: NURSE PRACTITIONER

## 2024-07-29 PROCEDURE — 6360000004 HC RX CONTRAST MEDICATION: Performed by: SURGERY

## 2024-07-29 PROCEDURE — 94761 N-INVAS EAR/PLS OXIMETRY MLT: CPT

## 2024-07-29 PROCEDURE — 6370000000 HC RX 637 (ALT 250 FOR IP): Performed by: NURSE PRACTITIONER

## 2024-07-29 PROCEDURE — 80053 COMPREHEN METABOLIC PANEL: CPT

## 2024-07-29 PROCEDURE — 36415 COLL VENOUS BLD VENIPUNCTURE: CPT

## 2024-07-29 PROCEDURE — 6360000002 HC RX W HCPCS: Performed by: INTERNAL MEDICINE

## 2024-07-29 PROCEDURE — 74019 RADEX ABDOMEN 2 VIEWS: CPT

## 2024-07-29 PROCEDURE — 85025 COMPLETE CBC W/AUTO DIFF WBC: CPT

## 2024-07-29 PROCEDURE — 2580000003 HC RX 258: Performed by: INTERNAL MEDICINE

## 2024-07-29 RX ORDER — KETOROLAC TROMETHAMINE 30 MG/ML
30 INJECTION, SOLUTION INTRAMUSCULAR; INTRAVENOUS EVERY 6 HOURS PRN
Status: DISCONTINUED | OUTPATIENT
Start: 2024-07-29 | End: 2024-07-30 | Stop reason: HOSPADM

## 2024-07-29 RX ORDER — BISACODYL 10 MG
10 SUPPOSITORY, RECTAL RECTAL DAILY
Status: DISCONTINUED | OUTPATIENT
Start: 2024-07-29 | End: 2024-07-30 | Stop reason: HOSPADM

## 2024-07-29 RX ORDER — METOPROLOL TARTRATE 1 MG/ML
5 INJECTION, SOLUTION INTRAVENOUS EVERY 6 HOURS PRN
Status: DISCONTINUED | OUTPATIENT
Start: 2024-07-29 | End: 2024-07-30 | Stop reason: HOSPADM

## 2024-07-29 RX ADMIN — SODIUM CHLORIDE: 9 INJECTION, SOLUTION INTRAVENOUS at 21:52

## 2024-07-29 RX ADMIN — BISACODYL 10 MG: 10 SUPPOSITORY RECTAL at 14:29

## 2024-07-29 RX ADMIN — ONDANSETRON 4 MG: 2 INJECTION INTRAMUSCULAR; INTRAVENOUS at 14:16

## 2024-07-29 RX ADMIN — ONDANSETRON 4 MG: 2 INJECTION INTRAMUSCULAR; INTRAVENOUS at 20:24

## 2024-07-29 RX ADMIN — SODIUM CHLORIDE: 9 INJECTION, SOLUTION INTRAVENOUS at 08:05

## 2024-07-29 RX ADMIN — KETOROLAC TROMETHAMINE 30 MG: 30 INJECTION, SOLUTION INTRAMUSCULAR at 14:52

## 2024-07-29 RX ADMIN — METOCLOPRAMIDE 5 MG: 5 INJECTION, SOLUTION INTRAMUSCULAR; INTRAVENOUS at 04:51

## 2024-07-29 RX ADMIN — METOCLOPRAMIDE 5 MG: 5 INJECTION, SOLUTION INTRAMUSCULAR; INTRAVENOUS at 11:10

## 2024-07-29 RX ADMIN — BISACODYL 10 MG: 10 SUPPOSITORY RECTAL at 14:42

## 2024-07-29 RX ADMIN — KETOROLAC TROMETHAMINE 30 MG: 30 INJECTION, SOLUTION INTRAMUSCULAR at 20:50

## 2024-07-29 RX ADMIN — METOCLOPRAMIDE 5 MG: 5 INJECTION, SOLUTION INTRAMUSCULAR; INTRAVENOUS at 21:50

## 2024-07-29 RX ADMIN — DIATRIZOATE MEGLUMINE AND DIATRIZOATE SODIUM 120 ML: 600; 100 SOLUTION ORAL; RECTAL at 13:57

## 2024-07-29 RX ADMIN — METOCLOPRAMIDE 5 MG: 5 INJECTION, SOLUTION INTRAMUSCULAR; INTRAVENOUS at 17:01

## 2024-07-29 ASSESSMENT — PAIN DESCRIPTION - LOCATION
LOCATION: ABDOMEN

## 2024-07-29 ASSESSMENT — PAIN SCALES - GENERAL
PAINLEVEL_OUTOF10: 4
PAINLEVEL_OUTOF10: 3
PAINLEVEL_OUTOF10: 2
PAINLEVEL_OUTOF10: 5

## 2024-07-29 ASSESSMENT — PAIN - FUNCTIONAL ASSESSMENT
PAIN_FUNCTIONAL_ASSESSMENT: 0-10
PAIN_FUNCTIONAL_ASSESSMENT: ACTIVITIES ARE NOT PREVENTED
PAIN_FUNCTIONAL_ASSESSMENT: ACTIVITIES ARE NOT PREVENTED
PAIN_FUNCTIONAL_ASSESSMENT: 0-10

## 2024-07-29 ASSESSMENT — PAIN DESCRIPTION - ORIENTATION
ORIENTATION: RIGHT;LEFT
ORIENTATION: MID

## 2024-07-29 ASSESSMENT — PAIN DESCRIPTION - DESCRIPTORS
DESCRIPTORS: DISCOMFORT
DESCRIPTORS: PRESSURE
DESCRIPTORS: PRESSURE
DESCRIPTORS: ACHING;DULL

## 2024-07-29 ASSESSMENT — PAIN DESCRIPTION - PAIN TYPE: TYPE: ACUTE PAIN

## 2024-07-29 NOTE — CARE COORDINATION
Case Management Assessment  Initial Evaluation    Date/Time of Evaluation: 7/29/2024 1:33 PM  Assessment Completed by: MEENU Kaur    If patient is discharged prior to next notation, then this note serves as note for discharge by case management.    Patient Name: Fox Morales                   YOB: 1980  Diagnosis: Small bowel obstruction (HCC) [K56.609]  SBO (small bowel obstruction) (HCC) [K56.609]                   Date / Time: 7/26/2024  6:21 PM    Patient Admission Status: Inpatient   Readmission Risk (Low < 19, Mod (19-27), High > 27): Readmission Risk Score: 5    Current PCP: Bernard Coker MD  [place] by CM? [place] ([facility]    Chart Reviewed: Yes      History Provided by: Patient, Significant Other  Patient Orientation: Alert and Oriented, Person, Place, Situation, Self    Patient Cognition: Alert    Hospitalization in the last 30 days (Readmission):  No    If yes, Readmission Assessment in  Navigator will be completed.    Advance Directives:      Code Status: Full Code   Patient's Primary Decision Maker is: Legal Next of Kin    Primary Decision Maker: MEAGAN MORALES - Spouse - [phone]    Discharge Planning:    Patient lives with: Spouse/Significant Other Type of Home: House  Primary Care Giver: Self  Patient Support Systems include: Spouse/Significant Other, Family Members   Current Financial resources: [place] ([facility])  Current community resources: None  Current services prior to admission: C-pap            Current DME:              Type of Home Care services:  None    ADLS  Prior functional level: Independent in ADLs/IADLs  Current functional level: Independent in ADLs/IADLs    PT AM-PAC:   /24  OT AM-PAC:   /24    Family can provide assistance at DC: Yes  Would you like Case Management to discuss the discharge plan with any other family members/significant others, and if so, who? Yes  Plans to Return to Present Housing: Yes  Other Identified Issues/Barriers to

## 2024-07-29 NOTE — PLAN OF CARE
Wife follows this nurse to  nursing station and stops writer. Complaining of care and lack of providers going over radiology films. Also c/o of nurse today. Apologized for wife's perception of \"poor care\" and spoke to Kerry WILSON to speak with the pt and wife.

## 2024-07-29 NOTE — PLAN OF CARE
Wife unhappy with transport ETA. Wants to drive pt herself. Np said no to her transporting pt d/t needing fluids and cardiac monitor. Writer called [facility] and informed BLS would be sufficient as pt is stable. [facility] Confirming with [facility] if pt leaves AMA will have to start the whole admission process over.Dot from [facility] confirms above.Emely is calling access back after she checks with her.

## 2024-07-29 NOTE — PLAN OF CARE
Problem: Discharge Planning  Goal: Discharge to home or other facility with appropriate resources  Outcome: Progressing  Flowsheets (Taken 7/27/2024 1845 by Lisbeth Gandhi, RN)  Discharge to home or other facility with appropriate resources:   Identify barriers to discharge with patient and caregiver   Arrange for needed discharge resources and transportation as appropriate   Identify discharge learning needs (meds, wound care, etc)   Refer to discharge planning if patient needs post-hospital services based on physician order or complex needs related to functional status, cognitive ability or social support system     Problem: Pain  Goal: Verbalizes/displays adequate comfort level or baseline comfort level  Outcome: Progressing  Flowsheets (Taken 7/28/2024 0700 by Elisha Benz RN)  Verbalizes/displays adequate comfort level or baseline comfort level:   Encourage patient to monitor pain and request assistance   Assess pain using appropriate pain scale   Implement non-pharmacological measures as appropriate and evaluate response     Problem: Gastrointestinal - Adult  Goal: Minimal or absence of nausea and vomiting  Outcome: Progressing  Flowsheets (Taken 7/28/2024 0258 by Lisbeth Gandhi, RN)  Minimal or absence of nausea and vomiting: Provide nonpharmacologic comfort measures as appropriate  Goal: Maintains or returns to baseline bowel function  Outcome: Progressing  Flowsheets (Taken 7/28/2024 0256 by Lisbeth Gandhi, RN)  Maintains or returns to baseline bowel function:   Assess bowel function   Encourage oral fluids to ensure adequate hydration   Administer IV fluids as ordered to ensure adequate hydration   Administer ordered medications as needed  Goal: Maintains adequate nutritional intake  Outcome: Progressing  Flowsheets (Taken 7/28/2024 0256 by Lisbeth Gandhi, RN)  Maintains adequate nutritional intake:   Monitor percentage of each meal consumed   Identify factors contributing to decreased

## 2024-07-29 NOTE — DISCHARGE SUMMARY
Discharge Summary    Fox Morales  :  1980  MRN:  [id number]    Admit date:  2024      Discharge date: 2024     Admitting Physician:  Shreyas Sanchez MD    Discharge Diagnoses:    Principal Problem:    SBO (small bowel obstruction) ([facility])  Resolved Problems:    * No resolved hospital problems. *      Hospital Course:   Fox Morales is a 43 y.o. male admitted with SBO.  Patient presented with complaints of vomiting and constipation.  Symptom onset 3 to 4 days.  He complained of abdominal distention and pain.  He does have history of bowel obstruction that was resolved with an appendectomy several years ago.  He was passing gas the day of arrival with small bowel movements.  Patient was evaluated and worked up and found to have small bowel obstruction.  General surgery was consulted patient was evaluated.  Patient refused NG placement during his hospitalization.  Serial abdominal films were monitored.  Patient did have some loose bowel movements and was started on clear liquids however did vomit with Jell-O.  Patient was made n.p.o. again.  I did encourage NG placement however patient continued to refuse.  I did spend much time talking with both the wife and the patient as far as treatment goes for small bowel obstruction versus ileus.  He did have a second surgeon evaluation who ordered small bowel follow-through however patient vomited with contrast and continued to refuse NG tube.  Ultimately the wife was requesting transfer to [facility].  I did call and speak to the hospitalist who is agreeable for transfer.  Plan will be to discharge patient to [facility] today via ambulance.  Blood pressure controlled.  I did give him an order for as needed Lopressor IV as he does have history of VT in the past and atrial flutter but no ectopy or arrhythmias were seen on telemetry.  Patient does continue to ambulate and tolerates well.  Hemodynamically he is stable.    Consultants:

## 2024-07-29 NOTE — PLAN OF CARE
Toradol given for diffuse # 3 abdominal pain. Dulcolax RS also given with instruction to allow suppository melt, then use BR. Pt and wife verbalize understanding.

## 2024-07-30 NOTE — PLAN OF CARE
Problem: Discharge Planning  Goal: Discharge to home or other facility with appropriate resources  Outcome: Completed     Problem: Pain  Goal: Verbalizes/displays adequate comfort level or baseline comfort level  Outcome: Completed     Problem: Gastrointestinal - Adult  Goal: Minimal or absence of nausea and vomiting  Outcome: Completed  Goal: Maintains or returns to baseline bowel function  Outcome: Completed  Goal: Maintains adequate nutritional intake  Outcome: Completed     Problem: Infection - Adult  Goal: Absence of infection at discharge  Outcome: Completed  Goal: Absence of infection during hospitalization  Outcome: Completed     Problem: Nutrition Deficit:  Goal: Optimize nutritional status  Outcome: Completed

## 2024-07-30 NOTE — PROGRESS NOTES
Assessment completed with patient lying in bed. Pt abdomen is distended but still soft. Denies nausea and only has pain when he lays on his side. Bowel sounds hypoactive. Pt was encouraged to ambulate often in the halls to stimulate bowel. See assessment flowsheet for additional information. Pt states all needs are met at this time. Call light in reach.  
Comprehensive Nutrition Assessment    Type and Reason for Visit:  Initial    Nutrition Recommendations/Plan:   Advance diet as GI feasible.   Monitor NPO/liquid PO duration.     Malnutrition Assessment:  Malnutrition Status:  At risk for malnutrition (Comment) (07/29/24 0226)    Context:  Acute Illness     Findings of the 6 clinical characteristics of malnutrition:  Energy Intake:  50% or less of estimated energy requirements for 5 or more days  Weight Loss:  No significant weight loss     Body Fat Loss:  No significant body fat loss     Muscle Mass Loss:  No significant muscle mass loss    Fluid Accumulation:  No significant fluid accumulation     Strength:  Not Performed    Nutrition Assessment:    Inadequate nutrient intakes r/t altered GI status, AEB poor intakes for nearly a week with n/v/d. Reports ongoing gas and bm but feels there shouldn't be anything in there. Weight above reported usual weights and lower than weights over a year ago. Being taken for xrays this AM.    Nutrition Related Findings:    active b/s, + soft bm, no edema. orange jello emesis. Wound Type: None       Current Nutrition Intake & Therapies:    Average Meal Intake: Unable to assess (no PO records)  Average Supplements Intake: None Ordered  ADULT DIET; Clear Liquid    Anthropometric Measures:  Height: 177.8 cm (5' 10\")  Ideal Body Weight (IBW): 166 lbs (75 kg)    Admission Body Weight: 116.5 kg (256 lb 13.4 oz)  Current Body Weight: 117 kg (257 lb 15 oz), 155.4 % IBW. Weight Source: Bed Scale  Current BMI (kg/m2): 37  Usual Body Weight: 120.2 kg (265 lb) (in April 2023, states 245-250# usual.)  % Weight Change (Calculated): -2.7  Weight Adjustment For: No Adjustment                 BMI Categories: Obese Class 2 (BMI 35.0 -39.9)    Estimated Daily Nutrient Needs:  Energy Requirements Based On: Kcal/kg  Weight Used for Energy Requirements: Current  Energy (kcal/day): 5365-4369 (15-18)  Weight Used for Protein Requirements: 
Dr. Sanchez called back to get an update on patient. Writer explained patient was walking around as tolerated and that he'd had 700mls of forced emesis. Dr. Sanchez asked if patient was having nausea but writer explained patient had forced himself to vomit to give himself pain relief but had not been complaining of nausea. New order received for reglan, see MAR.  
Hospital day #2  Patient had an emesis last night at 700 cc he continues to pass liquid stool feels his abdomen is still distended  Vital signs are stable he is afebrile  Chest clear to auscultation percussion  Cardiovascular regular rate and rhythm  Abdomen still soft distended hypoactive bowel sounds no guarding or rebound  Neurologically without focal findings  Laboratories all within acceptable limits  2 views of the abdomen continues to show small bowel distention with air-fluid levels still has evidence of colonic gas as well  Assessment plan patient continues with symptoms of an adynamic paralytic ileus clear liquid diet has been initiated by the medical service will continue to observe his overall clinical status  
[facility] called to report pt vomited contrast shortly after drinking it and per Radiologist they are to return pt to his room with no further images to be obtained. Will continue to monitor. l  
[facility], at this time via wheelchair. Patient independently stood and ambulated to bed. Patient is alert and orientated and on room air. Weight was obtained. Family at bedside.     Patient did refuse NG downstairs in ER. Dr. Madrigal was made aware.    Patient refused NG for writer at this time.   
Patient has been intermittently ambulating throughout the shift as tolerated. Patient and patient's wife were educated on the importance of ambulation as tolerated by patient.   
Patient is ambulating in the [place] with wife. Toradol was given per patient request. See MAR.  
Patient vomited 300 of green bile.  
Patient was discharged at this time. Patient's belongings were sent with wife and the locked cabinet was checked for medications. Patient denies questions about the transfer. Transport was given patient's paperwork.   
Patient was seen over the weekend by Dr. Madrigal for a partial SBO vs ileus.  Patient symptoms began 7/23.  He started with nausea, vomiting and diarrhea, and just generally feeling poor.  He stayed home from work, continuing feeling poorly, vomiting about 2 times a day with some loose stools.  The volume and frequency of his stools slowed. He stopped passing gas, and after feeling no better by Saturday (7/27) came to the hospital. He has had some mild pain, pressure, on the right side of his abdomen.    Yesterday he had been feeling better and was start on clear liquids by the hospitalist team.  Has was passing gas and was no longer nauseated.  By yesterday even was feeling very distended and nauseated again.  He is no longer passing flatus, and feels his distension is worse.    To date he has refused NG tube placement.    /80   Pulse 60   Temp 97.3 °F (36.3 °C) (Temporal)   Resp 18   Ht 1.778 m (5' 10\")   Wt 117 kg (257 lb 15 oz)   SpO2 95%   BMI 37.01 kg/m²     Patient is alert, cooperative, obese, in mild distress.     Lungs - clear  Heart - RRR  Abd - Distended, diminished bowel sounds.  Mild diffuse tenderness without guarding or rebound.    Xray today shows increased small bowel distension.  Colon does not have much air.    CBC isokay    CMP is okay except for minimally elevated bilirubin.    Gall stones seen on CT - no biliary air.    IMP/PLAN  1) Partial SBO vs ileus - clinically seems like a severe gastroenteritis  - Last spring he has similar episode.  Ended up thinking it was due to appendicitis, but his appendix was normal.  Suggest a possible underlying small bowel problem, or this could be coincidental  - Adhesive obstruction is possible.    I recommend an NG tube and continue supportive care.  - He does not want a NG tube.  I explained that the treatment for SBO includes NG drainage and while they are quite unpleasant NG tubes can have several beneficial effects.   - By removing excess 
Progress Note    SUBJECTIVE:    Patient seen for f/u of Small bowel obstruction (HCC).  He resting in bed no distress.  Stated he attempted jello yesterday but brought it back up un-dissolved.  No BM or Flatus      I did return to the room and had a lengthy conversation with the wife and the patient regarding his current condition and our recommendations and plan of treatment.  We discussed bowel obstruction versus ileus with discussed treatments.  We discussed general surgery consults.  Questions answered.    ROS:   Constitutional: negative  for fevers, and negative for chills.  Respiratory: negative for shortness of breath, negative for cough, and negative for wheezing  Cardiovascular: negative for chest pain, and negative for palpitations  Gastrointestinal: positive for abdominal pain, negative  for nausea,negative for vomiting, negative for diarrhea, and negative for constipation     All other systems were reviewed with the patient and are negative unless otherwise stated in HPI      OBJECTIVE:      Vitals:   Vitals:    07/29/24 0726   BP: 139/80   Pulse: 60   Resp: 18   Temp: 97.3 °F (36.3 °C)   SpO2: 95%     Weight - Scale: 117 kg (257 lb 15 oz)   Height: 177.8 cm (5' 10\")     Weight  Wt Readings from Last 3 Encounters:   07/29/24 117 kg (257 lb 15 oz)   04/07/23 120.2 kg (265 lb)   04/06/23 120.2 kg (265 lb)     Body mass index is 37.01 kg/m².    24HR INTAKE/OUTPUT:      Intake/Output Summary (Last 24 hours) at 7/29/2024 1144  Last data filed at 7/29/2024 0451  Gross per 24 hour   Intake 4673 ml   Output 550 ml   Net 4123 ml     -----------------------------------------------------------------  Exam:    GEN:    Awake, alert and oriented x3.   EYES:  EOMI, pupils equal   NECK: Supple. No lymphadenopathy.  No carotid bruit  CVS:    regular rate and rhythm, no audible murmur  PULM:  CTA, no wheezes, rales or rhonchi, no acute respiratory distress  ABD:    Bowels sounds hypoactive.  Abdomen is soft.  Moderate 
Pt is A&Ox4, calm and cooperative. Assmt and VS completed as charted, see flow sheet. IV fluids continue as ordered, see MAR. Pt reports feeling \"bloated.\" Pt resting in bed, call light within reach, denies further needs. Will continue to monitor.      
Pt resting in bed at this time. Assessment shows pt abdomen distended by not firm, with no tenderness or guarding on palpation. At 1310, Pt reported \"vomiting\" jello from lunch. On inspection, writer observed 3 small chunks of orange jello still in shape, in the emesis basin. No other fluid present. Writer reassured pt and asked if he felt he needed something for his nausea. Pt would not say yes or no, but wife insisted. Pt agreed to take zofran for nausea. Wife inquired about getting a second opinion from another surgeon as well. Writer explained that I would have to speak with a supervisor to see about that process. Wife stated she would speak to the supervisor if needed to voice her concerns over her husbands care. Writer explained that the patient did refuse the NG tube which is the standard of care for patients with ileus or obstruction who are symptomatic. Wife stated \"he won't do it\". Pt wife was escorted to the nurses station to speak with a supervisor to inquire about the process of getting a second opinion. Wife appreciative of writers care, states the patient cannot sit here for days doing nothing. Writer expressed understanding of their situation, however stated that it is hard to get results we want when the patient refuses recommended treatments, and sometimes these situations take time to resolve. Wife is concerned that the patient doesn't have an ileus and instead has an obstruction because he has been through this before, and that belief is the basis of her concern and request for a second opinion. Wife was put in touch with another provider who is able to escalate her concerns. Wife is aware that any requests would likely not be addressed until tomorrow am. Wife stated understanding of this. Writer reassured the wife that we would keep his as comfortable as possible and to let us know if there is anything else we can do or if there are any further needs.   
Reassessment completed. See flowsheet for more information. Pt is having bowel movements and passing gas. Abdomen remains distended but not firm. Encouraged ambulation in halls. Pt has walked twice today. Pt states he feels hungry as well. Educated pt and wife on the new orders for today and explained what an ileus is. Notified them that pt would be getting a fluid bolus today, he is to remain NPO, and KUB would be done tomorrow am to assess if condition is improving. Pt states all needs have been met.  
Spiritual Services Interventions  0310/0310-01   7/27/2024  Jc Morales  43 y.o. year old male    Encounter Summary  Encounter Overview/Reason: (P) Initial Encounter  Service Provided For: (P) Patient and family together  Referral/Consult From: (P) Rounding  Last Encounter : (P) 07/27/24  Complexity of Encounter: (P) Moderate  Begin Time: (P) 1415  End Time : (P) 1430  Total Time Calculated: (P) 15 min     Spiritual/Emotional needs  Type: (P) Spiritual Support                    Assessment/Intervention/Outcome  Assessment: (P) Calm  Intervention: (P) Discussed belief system/Tenriism practices/frederick, Discussed illness injury and it’s impact  Outcome: (P) Encouraged, Engaged in conversation     
Vital signs and assessment completed and charted. Navigator complete. Home medications gone over. Telemetry applied. Continuous fluids started.    Patient educated on NPO status and reason for NPO. Biotene dry mouth spray given as well as mouth sponges soaked in water for patient's c/o of dry mouth.     Patient is firm on not having an NG at this time. Writer explain plan of care and educated on importance of bowel rest at this time. Patient educated on call light, bed, lights, and room. Writer gone over plan of care and orders with patient. Questions answered.    Patient is alert and orientated. Patient is stable on feet and capable of moving about in the room independently. Patient encouraged to call out if patient is feeling lightheadness, unsteady or is in need of any assistance. Patient educated on using call light to call out for assistance. Patient acknowledged. Call light, bedside table and personal belongings within reach.    
Writer at bedside for shift assessment. Patient sitting up in bed, respirations are even and unlabored while on room air. Vitals obtained and assessment completed, see flowsheet for details. Patient states he is slight nauseous, but stated he would like to wait about an hour for the zofran. pt denies further needs at this time. Call light in reach.     
Writer called the answering service for Dr. Sanchez to request simethicone for bloating per patient and family request. Per Dr. Sanchez, no new orders. Patient advised to continue to walk as tolerated.  
Writer spoke with patient regarding transport.  Informed him that the time given from access was the closest time available.  He was willing to stay and take that time.  Care on going.   
Writer updated patient's home med list. Patient aware. No further questions from patient or family at this time. Patient did report having a couple of watery brown stools overnight. Patient denies pain or nausea at this time.  Both patient and his wife spoke with the doctor this morning and have requested not to be woke for bedside report this morning as there are no updates they aren't aware of.  
CONTRAST 7/26/2024 7:26 pm TECHNIQUE: CT of the abdomen and pelvis was performed with the administration of intravenous contrast. Multiplanar reformatted images are provided for review. Automated exposure control, iterative reconstruction, and/or weight based adjustment of the mA/kV was utilized to reduce the radiation dose to as low as reasonably achievable. COMPARISON: None. HISTORY: ORDERING SYSTEM PROVIDED HISTORY: N/V/D TECHNOLOGIST PROVIDED HISTORY: N/V/D Decision Support Exception - unselect if not a suspected or confirmed emergency medical condition->Emergency Medical Condition (MA) FINDINGS: Lower Chest: The visualized lung bases are clear. Organs: No acute abnormality of the liver, spleen, pancreas, or adrenal glands are identified.  Multiple small stones layering within the gallbladder.  Normal symmetric renal enhancement, without evidence of hydronephrosis or renal calculi. GI/Bowel: There are diffusely dilated fluid-filled loops of small bowel, with gradual transition to normal caliber ileum in the right lower quadrant (series 2 images 79 through 100).  The remainder large bowel is normal in caliber.  Status post appendectomy. Pelvis: Circumferential bladder wall thickening, possibly due to under distension.  Normal sized prostate gland. Peritoneum/Retroperitoneum: There is no evidence of intraperitoneal free air or ascites.  There is no suspicious lymphadenopathy.  Normal caliber abdominal aorta. Bones/Soft Tissues: No acute osseous abnormality is identified.  Unremarkable abdominal wall soft tissues.     1. Diffusely dilated fluid-filled loops of small bowel, with somewhat gradual transition to normal caliber ileum in the right lower quadrant.  These findings may represent partial or developing small bowel obstruction. Alternatively, this may represent a small bowel enteritis with associated ileus.  Clinical correlation is recommended, as well as close interval follow-up. 2. Cholelithiasis.